# Patient Record
Sex: FEMALE | URBAN - METROPOLITAN AREA
[De-identification: names, ages, dates, MRNs, and addresses within clinical notes are randomized per-mention and may not be internally consistent; named-entity substitution may affect disease eponyms.]

---

## 2021-03-02 ENCOUNTER — OFFICE (OUTPATIENT)
Dept: URBAN - METROPOLITAN AREA CLINIC 106 | Facility: CLINIC | Age: 81
End: 2021-03-02
Payer: COMMERCIAL

## 2021-03-02 VITALS
HEIGHT: 62 IN | HEART RATE: 66 BPM | DIASTOLIC BLOOD PRESSURE: 72 MMHG | WEIGHT: 175 LBS | SYSTOLIC BLOOD PRESSURE: 114 MMHG | TEMPERATURE: 96.8 F

## 2021-03-02 DIAGNOSIS — R10.13 EPIGASTRIC PAIN: ICD-10-CM

## 2021-03-02 DIAGNOSIS — R14.3 FLATULENCE: ICD-10-CM

## 2021-03-02 DIAGNOSIS — R14.1 GAS PAIN: ICD-10-CM

## 2021-03-02 DIAGNOSIS — R14.2 ERUCTATION: ICD-10-CM

## 2021-03-02 PROCEDURE — 99204 OFFICE O/P NEW MOD 45 MIN: CPT | Performed by: INTERNAL MEDICINE

## 2021-03-02 RX ORDER — SUCRALFATE 1 G/1
TABLET ORAL
Qty: 60 | Refills: 5 | Status: ACTIVE
Start: 2021-03-02

## 2021-03-02 NOTE — SERVICEHPINOTES
BRThe patient, who is accompanied by her daughter and serves as her interpruter, reports a long history (3 years) of intermittent episodes of burning epigastric discomfort with associated belching but no dysphagia/odynophagia, emesis, GI tract bleeding symptoms or unexplained weight loss. BRPer the daughter's report, the patient's cardiologist (Dr. Carson) does not feel her symptoms are cardiac related and is concerned about a GI etiology. The patient is currently on Pantoprazole + Famotidine but has not noted any improvement in her symptoms. She has no had an endoscopic evaluation of her upper GI tract previously.

## 2021-03-02 NOTE — SERVICENOTES
I discussed with the patient and her daughter re: further evaluation of her symptoms with an EGD, especially since they have not improved with a PPI and H2RA. They would like to consider this some more - in the meantime, I will have her stop the Pantoprazole/Famotidine and change her to a trial of Carafate to see if that better controls her symptoms.